# Patient Record
Sex: FEMALE | Race: WHITE | NOT HISPANIC OR LATINO | Employment: OTHER | URBAN - METROPOLITAN AREA
[De-identification: names, ages, dates, MRNs, and addresses within clinical notes are randomized per-mention and may not be internally consistent; named-entity substitution may affect disease eponyms.]

---

## 2017-02-01 DIAGNOSIS — Z01.810 PRE-OPERATIVE CARDIOVASCULAR EXAMINATION: ICD-10-CM

## 2017-02-01 DIAGNOSIS — Z01.812 PRE-OPERATIVE LABORATORY EXAMINATION: ICD-10-CM

## 2017-02-01 LAB
ANION GAP SERPL CALC-SCNC: 9 MMOL/L (ref 0–11.9)
BUN SERPL-MCNC: 9 MG/DL (ref 8–22)
CALCIUM SERPL-MCNC: 9.3 MG/DL (ref 8.4–10.2)
CHLORIDE SERPL-SCNC: 102 MMOL/L (ref 96–112)
CO2 SERPL-SCNC: 27 MMOL/L (ref 20–33)
CREAT SERPL-MCNC: 0.75 MG/DL (ref 0.5–1.4)
EKG IMPRESSION: NORMAL
GFR SERPL CREATININE-BSD FRML MDRD: >60 ML/MIN/1.73 M 2
GLUCOSE SERPL-MCNC: 108 MG/DL (ref 65–99)
POTASSIUM SERPL-SCNC: 3.6 MMOL/L (ref 3.6–5.5)
SODIUM SERPL-SCNC: 138 MMOL/L (ref 135–145)

## 2017-02-01 PROCEDURE — 36415 COLL VENOUS BLD VENIPUNCTURE: CPT

## 2017-02-01 PROCEDURE — 80048 BASIC METABOLIC PNL TOTAL CA: CPT

## 2017-02-01 RX ORDER — AMLODIPINE BESYLATE 5 MG/1
5 TABLET ORAL EVERY MORNING
COMMUNITY

## 2017-02-01 RX ORDER — CALCIUM CARBONATE 500 MG/1
500 TABLET, CHEWABLE ORAL PRN
COMMUNITY
End: 2017-11-30

## 2017-02-01 RX ORDER — M-VIT,TX,IRON,MINS/CALC/FOLIC 27MG-0.4MG
1 TABLET ORAL DAILY
COMMUNITY
End: 2017-11-30

## 2017-02-01 NOTE — OR NURSING
PreAdmit Appointment: Patient instructed to continue regularly prescribed medications through day before surgery. Instructed to take the following medications the day of surgery with a sip of water per Anesthesia protocol: Amlodipine, Benicar.

## 2017-02-02 ENCOUNTER — APPOINTMENT (OUTPATIENT)
Dept: RADIOLOGY | Facility: MEDICAL CENTER | Age: 62
End: 2017-02-02
Attending: PODIATRIST
Payer: COMMERCIAL

## 2017-02-02 ENCOUNTER — HOSPITAL ENCOUNTER (OUTPATIENT)
Facility: MEDICAL CENTER | Age: 62
End: 2017-02-02
Attending: PODIATRIST | Admitting: PODIATRIST
Payer: COMMERCIAL

## 2017-02-02 VITALS
RESPIRATION RATE: 16 BRPM | HEIGHT: 61 IN | OXYGEN SATURATION: 95 % | DIASTOLIC BLOOD PRESSURE: 71 MMHG | BODY MASS INDEX: 36.3 KG/M2 | SYSTOLIC BLOOD PRESSURE: 130 MMHG | WEIGHT: 192.24 LBS | TEMPERATURE: 98.8 F

## 2017-02-02 PROBLEM — M20.11 ACQUIRED HALLUX VALGUS OF RIGHT FOOT: Status: ACTIVE | Noted: 2017-02-02

## 2017-02-02 PROCEDURE — 501838 HCHG SUTURE GENERAL: Performed by: PODIATRIST

## 2017-02-02 PROCEDURE — 700102 HCHG RX REV CODE 250 W/ 637 OVERRIDE(OP): Performed by: PODIATRIST

## 2017-02-02 PROCEDURE — 160041 HCHG SURGERY MINUTES - EA ADDL 1 MIN LEVEL 4: Performed by: PODIATRIST

## 2017-02-02 PROCEDURE — 700102 HCHG RX REV CODE 250 W/ 637 OVERRIDE(OP)

## 2017-02-02 PROCEDURE — A9270 NON-COVERED ITEM OR SERVICE: HCPCS | Performed by: PODIATRIST

## 2017-02-02 PROCEDURE — 502240 HCHG MISC OR SUPPLY RC 0272: Performed by: PODIATRIST

## 2017-02-02 PROCEDURE — 73620 X-RAY EXAM OF FOOT: CPT | Mod: RT

## 2017-02-02 PROCEDURE — 160025 RECOVERY II MINUTES (STATS): Performed by: PODIATRIST

## 2017-02-02 PROCEDURE — 160035 HCHG PACU - 1ST 60 MINS PHASE I: Performed by: PODIATRIST

## 2017-02-02 PROCEDURE — 160046 HCHG PACU - 1ST 60 MINS PHASE II: Performed by: PODIATRIST

## 2017-02-02 PROCEDURE — 500433 HCHG DRESSING, KLING 4': Performed by: PODIATRIST

## 2017-02-02 PROCEDURE — 500126 HCHG BOVIE, NEEDLE TIP: Performed by: PODIATRIST

## 2017-02-02 PROCEDURE — 110382 HCHG SHELL REV 271: Performed by: PODIATRIST

## 2017-02-02 PROCEDURE — 160002 HCHG RECOVERY MINUTES (STAT): Performed by: PODIATRIST

## 2017-02-02 PROCEDURE — 700111 HCHG RX REV CODE 636 W/ 250 OVERRIDE (IP): Performed by: PODIATRIST

## 2017-02-02 PROCEDURE — 160022 HCHG BLOCK: Performed by: PODIATRIST

## 2017-02-02 PROCEDURE — 160047 HCHG PACU  - EA ADDL 30 MINS PHASE II: Performed by: PODIATRIST

## 2017-02-02 PROCEDURE — 160029 HCHG SURGERY MINUTES - 1ST 30 MINS LEVEL 4: Performed by: PODIATRIST

## 2017-02-02 PROCEDURE — C1713 ANCHOR/SCREW BN/BN,TIS/BN: HCPCS | Performed by: PODIATRIST

## 2017-02-02 PROCEDURE — 700101 HCHG RX REV CODE 250

## 2017-02-02 PROCEDURE — 160036 HCHG PACU - EA ADDL 30 MINS PHASE I: Performed by: PODIATRIST

## 2017-02-02 PROCEDURE — 160009 HCHG ANES TIME/MIN: Performed by: PODIATRIST

## 2017-02-02 PROCEDURE — 700111 HCHG RX REV CODE 636 W/ 250 OVERRIDE (IP)

## 2017-02-02 PROCEDURE — A9270 NON-COVERED ITEM OR SERVICE: HCPCS

## 2017-02-02 PROCEDURE — 502000 HCHG MISC OR IMPLANTS RC 0278: Performed by: PODIATRIST

## 2017-02-02 PROCEDURE — 110371 HCHG SHELL REV 272: Performed by: PODIATRIST

## 2017-02-02 PROCEDURE — 500053 HCHG BANDAGE, ELASTIC 4: Performed by: PODIATRIST

## 2017-02-02 PROCEDURE — 160048 HCHG OR STATISTICAL LEVEL 1-5: Performed by: PODIATRIST

## 2017-02-02 PROCEDURE — A4606 OXYGEN PROBE USED W OXIMETER: HCPCS | Performed by: PODIATRIST

## 2017-02-02 PROCEDURE — 500881 HCHG PACK, EXTREMITY: Performed by: PODIATRIST

## 2017-02-02 DEVICE — DBM PUTTY PROGENIX 5.0CC: Type: IMPLANTABLE DEVICE | Status: FUNCTIONAL

## 2017-02-02 DEVICE — IMPLANTABLE DEVICE: Type: IMPLANTABLE DEVICE | Status: FUNCTIONAL

## 2017-02-02 RX ORDER — BUPIVACAINE HYDROCHLORIDE AND EPINEPHRINE 5; 5 MG/ML; UG/ML
INJECTION, SOLUTION EPIDURAL; INTRACAUDAL; PERINEURAL
Status: DISCONTINUED | OUTPATIENT
Start: 2017-02-02 | End: 2017-02-02 | Stop reason: HOSPADM

## 2017-02-02 RX ORDER — ACETAMINOPHEN 500 MG
1000 TABLET ORAL EVERY 6 HOURS
Status: DISCONTINUED | OUTPATIENT
Start: 2017-02-02 | End: 2017-02-02 | Stop reason: HOSPADM

## 2017-02-02 RX ORDER — OXYCODONE HCL 5 MG/5 ML
SOLUTION, ORAL ORAL
Status: COMPLETED
Start: 2017-02-02 | End: 2017-02-02

## 2017-02-02 RX ORDER — LIDOCAINE HYDROCHLORIDE 10 MG/ML
INJECTION, SOLUTION INFILTRATION; PERINEURAL
Status: DISCONTINUED | OUTPATIENT
Start: 2017-02-02 | End: 2017-02-02 | Stop reason: HOSPADM

## 2017-02-02 RX ORDER — LIDOCAINE HYDROCHLORIDE 10 MG/ML
INJECTION, SOLUTION INFILTRATION; PERINEURAL
Status: COMPLETED
Start: 2017-02-02 | End: 2017-02-02

## 2017-02-02 RX ORDER — SODIUM CHLORIDE, SODIUM LACTATE, POTASSIUM CHLORIDE, CALCIUM CHLORIDE 600; 310; 30; 20 MG/100ML; MG/100ML; MG/100ML; MG/100ML
1000 INJECTION, SOLUTION INTRAVENOUS
Status: DISCONTINUED | OUTPATIENT
Start: 2017-02-02 | End: 2017-02-02 | Stop reason: HOSPADM

## 2017-02-02 RX ORDER — ONDANSETRON 2 MG/ML
4 INJECTION INTRAMUSCULAR; INTRAVENOUS EVERY 6 HOURS PRN
Status: DISCONTINUED | OUTPATIENT
Start: 2017-02-02 | End: 2017-02-02 | Stop reason: HOSPADM

## 2017-02-02 RX ADMIN — SODIUM CHLORIDE, POTASSIUM CHLORIDE, SODIUM LACTATE AND CALCIUM CHLORIDE 1000 ML: 600; 310; 30; 20 INJECTION, SOLUTION INTRAVENOUS at 09:51

## 2017-02-02 RX ADMIN — LIDOCAINE HYDROCHLORIDE 0.2 ML: 10 INJECTION, SOLUTION INFILTRATION; PERINEURAL at 09:51

## 2017-02-02 RX ADMIN — OXYCODONE HYDROCHLORIDE 5 MG: 5 SOLUTION ORAL at 14:35

## 2017-02-02 ASSESSMENT — PAIN SCALES - GENERAL
PAINLEVEL_OUTOF10: 0
PAINLEVEL_OUTOF10: 4
PAINLEVEL_OUTOF10: 0
PAINLEVEL_OUTOF10: 0
PAINLEVEL_OUTOF10: 4
PAINLEVEL_OUTOF10: 0
PAINLEVEL_OUTOF10: 4

## 2017-02-02 NOTE — OR NURSING
0971Pt to pre-op. Name, birthday, allergies, NPO status, last void, medication rec, surgical site and procedure verified with patient.  Pt belongings collected and secured in locker.  Pt verbalizes understanding of pain scale, expected plan of care and course of stay.  IV established. Sequentials placed.  Family at bedside.

## 2017-02-02 NOTE — OR NURSING
1345 assumed care of pt. Even non labored breathing. Skin pink warm dry. Sleeping. RLE dressing cdi, foot boot, brisk cap refill, pink warm, elevated on pillow, ice pack to lower part of foot.   1400 awake, denies pain and nausea. Even non labored breathing, face relaxed. Wiggles toes, dorsi flexion/extension performed.   1415 no changes  1430 report given to Seema Alberts

## 2017-02-02 NOTE — IP AVS SNAPSHOT
2/2/2017          Yuko Boo  [address] [place] [address]    Dear Yuko:    Critical access hospital wants to ensure your discharge home is safe and you or your loved ones have had all your questions answered regarding your care after you leave the hospital.    You may receive a telephone call within two days of your discharge.  This call is to make certain you understand your discharge instructions as well as ensure we provided you with the best care possible during your stay with us.     The call will only last approximately 3-5 minutes and will be done by a nurse.    Once again, we want to ensure your discharge home is safe and that you have a clear understanding of any next steps in your care.  If you have any questions or concerns, please do not hesitate to contact us, we are here for you.  Thank you for choosing [facility] for your healthcare needs.    Sincerely,    Vaibhav Landrum    [facility]

## 2017-02-02 NOTE — IP AVS SNAPSHOT
" After Visit Summary                                                                                                                Name:Yuko Boo  Medical Record Number:[id number]  CSN: [id number]    YOB: 1955   Age: 61 y.o.  Sex: female  HT:1.549 m (5' 1\") WT: 87.2 kg (192 lb 3.9 oz)          Admit Date: 2/2/2017     Discharge Date:   Today's Date: 2/2/2017  Attending Doctor:  Aldo Antonio D.P.M.                  Allergies:  Review of patient's allergies indicates no known allergies.                Discharge Instructions         ACTIVITY: Rest and take it easy for the first 24 hours.  A responsible adult is recommended to remain with you during that time.  It is normal to feel sleepy.  We encourage you to not do anything that requires balance, judgment or coordination.    MILD FLU-LIKE SYMPTOMS ARE NORMAL. YOU MAY EXPERIENCE GENERALIZED MUSCLE ACHES, THROAT IRRITATION, HEADACHE AND/OR SOME NAUSEA.    FOR 24 HOURS DO NOT:  Drive, operate machinery or run household appliances.  Drink beer or alcoholic beverages.   Make important decisions or sign legal documents.    SPECIAL INSTRUCTIONS:     Right lower extremity: Non weight bearing  Keep dressing clean dry intact until further MD instructions  Wear post op shoe at all times  Keep dressing clean, dry and intact until physician indicates otherwise  If you develop any chest pain, shortness of breath, palpitations or any issues with your heart go to the ER (per anesthesia doctor)    DIET: To avoid nausea, slowly advance diet as tolerated, avoiding spicy or greasy foods for the first day.  Add more substantial food to your diet according to your physician's instructions.  Babies can be fed formula or breast milk as soon as they are hungry.  INCREASE FLUIDS AND FIBER TO AVOID CONSTIPATION.    FOLLOW-UP APPOINTMENT:  A follow-up appointment should be arranged with your doctor: call to schedule.    You should CALL YOUR PHYSICIAN if you " develop:  Fever greater than 101 degrees F.  Pain not relieved by medication, or persistent nausea or vomiting.  Excessive bleeding (blood soaking through dressing) or unexpected drainage from the wound.  Extreme redness or swelling around the incision site, drainage of pus or foul smelling drainage.  Inability to urinate or empty your bladder within 8 hours.  Problems with breathing or chest pain.    You should call 911 if you develop problems with breathing or chest pain.  If you are unable to contact your doctor or surgical center, you should go to the nearest emergency room or urgent care center.    Physician's telephone #: [phone]    If any questions arise, call your doctor.  If your doctor is not available, please feel free to call the Surgical Center at [phone].  The Center is open Monday through Friday from 7AM to 7PM.  You can also call the [employer] HOTLINE open 24 hours/day, 7 days/week and speak to a nurse at [phone], or toll free at [phone].    A registered nurse may call you a few days after your surgery to see how you are doing after your procedure.    MEDICATIONS: Resume taking daily medication.  Take prescribed pain medication with food.  If no medication is prescribed, you may take non-aspirin pain medication if needed.  PAIN MEDICATION CAN BE VERY CONSTIPATING.  Take a stool softener or laxative such as senokot, pericolace, or milk of magnesia if needed.    Prescription given for preopertively.  Last pain medication given at 2:35PM (5mg oxycodone).    If your physician has prescribed pain medication that includes Acetaminophen (Tylenol), do not take additional Acetaminophen (Tylenol) while taking the prescribed medication.    Depression / Suicide Risk    As you are discharged from this [facility] facility, it is important to learn how to keep safe from harming yourself.    Recognize the warning signs:  · Abrupt changes in personality, positive or negative- including  increase in energy   · Giving away possessions  · Change in eating patterns- significant weight changes-  positive or negative  · Change in sleeping patterns- unable to sleep or sleeping all the time   · Unwillingness or inability to communicate  · Depression  · Unusual sadness, discouragement and loneliness  · Talk of wanting to die  · Neglect of personal appearance   · Rebelliousness- reckless behavior  · Withdrawal from people/activities they love  · Confusion- inability to concentrate     If you or a loved one observes any of these behaviors or has concerns about self-harm, here's what you can do:  · Talk about it- your feelings and reasons for harming yourself  · Remove any means that you might use to hurt yourself (examples: pills, rope, extension cords, firearm)  · Get professional help from the community (Mental Health, Substance Abuse, psychological counseling)  · Do not be alone:Call your Safe Contact- someone whom you trust who will be there for you.  · Call your local CRISIS HOTLINE 881-3455 or 903-904-3003  · Call your local Children's Mobile Crisis Response Team [place] (944) 153-6377 or www[employer]  · Call the toll free National Suicide Prevention Hotlines   · National Suicide Prevention Lifeline 336-922-PZQJ (1596)  National Hope Line Network 800-SUICIDE (755-7011)    Discharge Education for patients on DELILAH (Obstructive Sleep Apnea) Protocol    Prior to receiving sedation or anesthesia, we screen all patients for Obstructive Sleep Apnea.  During your screening, you were identified as having suspected, but not confirmed Obstructive Sleep Apnea(DELILAH).    What is Obstructive Sleep Apnea?  Sleep apnea (AP-ne-ah) is a common disorder which involves breathing pauses that occur during sleep.  These can last from 10 seconds to a minute or longer.  Normal breathing resumes often with a loud snort or choking sound.    Sleep apnea occurs in all age groups and both genders but is more common in men  and people over 40 years of age.  It has been estimated that as many as 18 million Americans have sleep apnea.  Most people who have sleep apnea don’t know they have it because it only occurs during sleep.  A family member and/or bed partner may first notice the signs of sleep apnea.  Sleep apnea is a chronic (ongoing) condition that disrupts the quality and quantity of your sleep repeatedly throughout the night.  This often results in excessive daytime sleepiness or fatigue during the day.  It may also contribute to high blood pressure, heart problems, and complications following medications used for surgery and procedures.    To establish a definitive diagnosis, further testing from a specialist would be needed.  We recommend that you follow up with your primary care physician.    We recommend that you should be with an adult observer for at least 24 hours after your sedation/anesthesia.  If you have a CPAP machine, you should wear it during any sleep period (day or night) for the week following your procedure.  We encourage you to sleep on your side or in a sitting position, even with napping.  Lying flat on your back increases the risk of apnea and airway obstruction during your post procedure recovery period.    It is important to prevent over-sedation that could increase your risk for apnea.  Please take all pain medication as directed by your physician.  If you are not getting pain relief, please contact your physician to discuss possible approaches to relieving pain while minimizing medications that can affect your breathing and oxygen levels.         Medication List      CONTINUE taking these medications        Instructions    ALEVE PO    Take  by mouth as needed.       amlodipine 5 MG Tabs   Commonly known as:  NORVASC    Take 5 mg by mouth every morning.   Dose:  5 mg       calcium carbonate 500 MG Chew   Commonly known as:  TUMS    Take 500 mg by mouth as needed.   Dose:  500 mg       olmesartan 40 MG  Tabs   Commonly known as:  BENICAR    Take 1 Tab by mouth every day.   Dose:  40 mg       therapeutic multivitamin-minerals Tabs    Take 1 Tab by mouth every day.   Dose:  1 Tab       triamterene-hctz 37.5-25 MG Tabs   Commonly known as:  MAXZIDE-25/DYAZIDE    Take 1 Tab by mouth every day.   Dose:  1 Tab       vitamin D 1000 UNIT Tabs   Commonly known as:  cholecalciferol    Take 1,000 Units by mouth every day.   Dose:  1000 Units               Medication Information     Above and/or attached are the medications your physician expects you to take upon discharge. Review all of your home medications and newly ordered medications with your doctor and/or pharmacist. Follow medication instructions as directed by your doctor and/or pharmacist. Please keep your medication list with you and share with your physician. Update the information when medications are discontinued, doses are changed, or new medications (including over-the-counter products) are added; and carry medication information at all times in the event of emergency situations.        Resources     Quit Smoking / Tobacco Use:    I understand the use of any tobacco products increases my chance of suffering from future heart disease or stroke and could cause other illnesses which may shorten my life. Quitting the use of tobacco products is the single most important thing I can do to improve my health. For further information on smoking / tobacco cessation call a Toll Free Quit Line at 1-798.920.1435 (*[employer] [place]) or 1-765.862.4876 (American Lung Association) or you can access the web based program at www.lungusa.org.    [place] Tobacco Users Help Line:  (585) 912-9472       Toll Free: 1-578.260.8537  Quit Tobacco Program [facility] (335)104-5454    Crisis Hotline:    [place] Crisis Hotline:  6-587-YGNEMSV or 1-836.222.1078    [place] Crisis Hotline:    1-863.805.1426 or 490-741-1864    Discharge Survey:   Thank you for  choosing [facility]. We hope we did everything we could to make your hospital stay a pleasant one. You may be receiving a survey and we would appreciate your time and participation in answering the questions. Your input is very valuable to us in our efforts to improve our service to our patients and their families.            Signatures     My signature on this form indicates that:    1. I acknowledge receipt and understanding of these Home Care Instruction.  2. My questions regarding this information have been answered to my satisfaction.  3. I have formulated a plan with my discharge nurse to obtain my prescribed medications for home.    __________________________________      __________________________________                   Patient Signature                                 Guardian/Responsible Adult Signature      __________________________________                 __________       ________                       Nurse Signature                                               Date                 Time

## 2017-02-02 NOTE — PROGRESS NOTES
Anesthesia Post-op Note    Patient is a 61 year old female with obesity, HTN, and borderline DM type II. She underwent bunionectomy. The case was uneventful until the end when the surgeon was placing the dressing and I noticed the patient developed what looked like a new bundle branch block (unable to tell if RBBB or LBBB given that only lead II was being monitored intra-operatively at the time. All patient's vital signs were stable. The tourniquet was still up (approximately 100 mins total time at that point) and I was turning down the vapor anesthetic concentration in preparation for emergence from anesthesia. The tourniquet came down one minute later and approximately 5 mins later when patient was awake in PACU the EKG returned to her normal baseline tracing and unfortunately we were unable to capture what appeared to be a bundle branch block on a 12-lead EKG. During her stay in PACU her EKG remained normal and patient denied any concerning cardiac symptoms. It appears that the EKG abnormality maybe just be due to some excessive sympathetic stimulation given the tourniquet pain in setting of emergence from anesthesia. She was informed of the incident and given strict return precautions. Otherwise, her meliton-operative course was uneventful.    David Beckham  [facility]

## 2017-02-02 NOTE — OR NURSING
1430: resumed care of patient. Dressing CDI with ice pack in place. Denies nausea, pain 4/10, plan to medicate with orals.  1445: meets criteria for stage 2. Will call report.  1448: report to KEE Altamirano.

## 2017-02-02 NOTE — DISCHARGE INSTRUCTIONS
ACTIVITY: Rest and take it easy for the first 24 hours.  A responsible adult is recommended to remain with you during that time.  It is normal to feel sleepy.  We encourage you to not do anything that requires balance, judgment or coordination.    MILD FLU-LIKE SYMPTOMS ARE NORMAL. YOU MAY EXPERIENCE GENERALIZED MUSCLE ACHES, THROAT IRRITATION, HEADACHE AND/OR SOME NAUSEA.    FOR 24 HOURS DO NOT:  Drive, operate machinery or run household appliances.  Drink beer or alcoholic beverages.   Make important decisions or sign legal documents.    SPECIAL INSTRUCTIONS:     Right lower extremity: Non weight bearing  Keep dressing clean dry intact until further MD instructions  Wear post op shoe at all times  Keep dressing clean, dry and intact until physician indicates otherwise  If you develop any chest pain, shortness of breath, palpitations or any issues with your heart go to the ER (per anesthesia doctor)    DIET: To avoid nausea, slowly advance diet as tolerated, avoiding spicy or greasy foods for the first day.  Add more substantial food to your diet according to your physician's instructions.  Babies can be fed formula or breast milk as soon as they are hungry.  INCREASE FLUIDS AND FIBER TO AVOID CONSTIPATION.    FOLLOW-UP APPOINTMENT:  A follow-up appointment should be arranged with your doctor: call to schedule.    You should CALL YOUR PHYSICIAN if you develop:  Fever greater than 101 degrees F.  Pain not relieved by medication, or persistent nausea or vomiting.  Excessive bleeding (blood soaking through dressing) or unexpected drainage from the wound.  Extreme redness or swelling around the incision site, drainage of pus or foul smelling drainage.  Inability to urinate or empty your bladder within 8 hours.  Problems with breathing or chest pain.    You should call 911 if you develop problems with breathing or chest pain.  If you are unable to contact your doctor or surgical center, you should go to the nearest  emergency room or urgent care center.    Physician's telephone #: [phone]    If any questions arise, call your doctor.  If your doctor is not available, please feel free to call the Surgical Center at [phone].  The Center is open Monday through Friday from 7AM to 7PM.  You can also call the HEALTH HOTLINE open 24 hours/day, 7 days/week and speak to a nurse at [phone], or toll free at [phone].    A registered nurse may call you a few days after your surgery to see how you are doing after your procedure.    MEDICATIONS: Resume taking daily medication.  Take prescribed pain medication with food.  If no medication is prescribed, you may take non-aspirin pain medication if needed.  PAIN MEDICATION CAN BE VERY CONSTIPATING.  Take a stool softener or laxative such as senokot, pericolace, or milk of magnesia if needed.    Prescription given for preopertively.  Last pain medication given at 2:35PM (5mg oxycodone).    If your physician has prescribed pain medication that includes Acetaminophen (Tylenol), do not take additional Acetaminophen (Tylenol) while taking the prescribed medication.    Depression / Suicide Risk    As you are discharged from this [facility] facility, it is important to learn how to keep safe from harming yourself.    Recognize the warning signs:  · Abrupt changes in personality, positive or negative- including increase in energy   · Giving away possessions  · Change in eating patterns- significant weight changes-  positive or negative  · Change in sleeping patterns- unable to sleep or sleeping all the time   · Unwillingness or inability to communicate  · Depression  · Unusual sadness, discouragement and loneliness  · Talk of wanting to die  · Neglect of personal appearance   · Rebelliousness- reckless behavior  · Withdrawal from people/activities they love  · Confusion- inability to concentrate     If you or a loved one observes any of these behaviors or has concerns about  self-harm, here's what you can do:  · Talk about it- your feelings and reasons for harming yourself  · Remove any means that you might use to hurt yourself (examples: pills, rope, extension cords, firearm)  · Get professional help from the community (Mental Health, Substance Abuse, psychological counseling)  · Do not be alone:Call your Safe Contact- someone whom you trust who will be there for you.  · Call your local CRISIS HOTLINE 988-5188 or 756-263-3635  · Call your local Children's Mobile Crisis Response Team [place] (480) 504-7612 or www.[employer]  · Call the toll free National Suicide Prevention Hotlines   · National Suicide Prevention Lifeline 722-792-NPGI (4677)  [place] [employer] Line Network 800-SUICIDE (505-7926)    Discharge Education for patients on DELILAH (Obstructive Sleep Apnea) Protocol    Prior to receiving sedation or anesthesia, we screen all patients for Obstructive Sleep Apnea.  During your screening, you were identified as having suspected, but not confirmed Obstructive Sleep Apnea(DELILAH).    What is Obstructive Sleep Apnea?  Sleep apnea (AP-ne-ah) is a common disorder which involves breathing pauses that occur during sleep.  These can last from 10 seconds to a minute or longer.  Normal breathing resumes often with a loud snort or choking sound.    Sleep apnea occurs in all age groups and both genders but is more common in men and people over 40 years of age.  It has been estimated that as many as 18 million Americans have sleep apnea.  Most people who have sleep apnea don’t know they have it because it only occurs during sleep.  A family member and/or bed partner may first notice the signs of sleep apnea.  Sleep apnea is a chronic (ongoing) condition that disrupts the quality and quantity of your sleep repeatedly throughout the night.  This often results in excessive daytime sleepiness or fatigue during the day.  It may also contribute to high blood pressure, heart problems, and  complications following medications used for surgery and procedures.    To establish a definitive diagnosis, further testing from a specialist would be needed.  We recommend that you follow up with your primary care physician.    We recommend that you should be with an adult observer for at least 24 hours after your sedation/anesthesia.  If you have a CPAP machine, you should wear it during any sleep period (day or night) for the week following your procedure.  We encourage you to sleep on your side or in a sitting position, even with napping.  Lying flat on your back increases the risk of apnea and airway obstruction during your post procedure recovery period.    It is important to prevent over-sedation that could increase your risk for apnea.  Please take all pain medication as directed by your physician.  If you are not getting pain relief, please contact your physician to discuss possible approaches to relieving pain while minimizing medications that can affect your breathing and oxygen levels.

## 2017-02-02 NOTE — OR NURSING
1453 patient to stage 2  Patient settled in recliner chair post short ambulation from [facility] - pt dressed with assist by CNA. Dressing CDI to R foot/toes, elevated on pillow with ice pack on R ankle. Pain rated as tolerable and denies nausea.   1520 Crutch training for NWB demonstrated, education sheet given and reviewed and verbal instructions given during demonstration. Pt able to demonstrate safe ambulation 20 feet into bathroom with safe transfer techniques as shown by RN.   1532 D/Ajay to care of family post uneventful stay in PACU 2.

## 2017-02-02 NOTE — OR NURSING
1338: To PACU from OR via gurney, sleeping, respirations spontaneous and non-labored via OPA, NC @ 4L blow-by. Dressing to right foot CDI, post op shoe in place, cap refill to toes  < 3 secs. Anesthesia indicated patient's EKG had changed to what appears to be a RBBB but patient's rhythm changed back to original rhythm after about a minute of being in PACU, he indicated to keep monitoring and if it changes again to get 12 lead EKG.  1345: OPA d/joaquim, too drowsy to assess pain/nausea level. Report to KEE Sullivan.

## 2017-02-02 NOTE — OR SURGEON
Immediate Post-Operative Note      PreOp Diagnosis:  Severe hallux valgus deformity right foot     PostOp Diagnosis: same    Procedure(s):  ORTHOPEDIC OSTEOTOMY -  IST METATARSAL W/AUTOGRAFT/ALLOGRAFT - Wound Class: Clean  BUNIONECTOMY - GARRIDO     Surgeon(s):  Aldo Antonio D.P.M.    Anesthesiologist/Type of Anesthesia:  Anesthesiologist: José Luis Beckham M.D./General    Surgical Staff:  Circulator: PALAK Liao Circulator: Lavonne Kumar R.N.  Scrub Person: Ed Hernandez    Specimen: NONE    Estimated Blood Loss: NONE    Findings:Severe hallux valgus deformity right foot         Complications: NONE        2/2/2017 1:57 PM Aldo Antonio

## 2017-02-03 NOTE — OP REPORT
DATE OF SERVICE:  02/02/2017    PREOPERATIVE DIAGNOSIS:  Severe hallux valgus deformity, right foot.    POSTOPERATIVE DIAGNOSIS:  Severe hallux valgus deformity, right foot.    SURGEON:  Aldo Antonio DPM    ANESTHESIOLOGIST:  José Luis Beckham MD.    INDICATIONS FOR SURGERY:  Failed conservative and supportive measures, bracing   appropriate shoes, anti-inflammatories without relief of symptomology.    NARRATIVE:  Prior to routine sterile scrub and prep, the patient is   identified, surgical [facility] is [facility] and antibiotics   have been given 20 minutes prior to tourniquet inflation.  The surgical site   being the right foot identified and nature of procedure identified, after   which time, the patient was brought to the operatory, the foot was elevated   and exsanguinated and a mini prep is accomplished utilizing 0.5% Marcaine,   1:200,000 epinephrine, 1% Xylocaine plain in a 1:1 ratio, 14 mL for field   block at midfoot and 4 more mL at the surgical site base of the first   metatarsal and first metatarsophalangeal joint at the end of procedure.  After   which time, routine sterile scrub and prep having been completed.  The foot   elevated using an Esmarch for exsanguination.  The tourniquet was raised to   225 mmHg.  The foot was lowered to surgical field.  Our attention was directed   to the dorsal aspect of the right first metatarsophalangeal joint and the   metatarsal cuneiform joint where dorsal longitudinal incision across both   joints is accomplished.  Sharp and dull dissection carefully reflecting   subcutaneous tissue at the first metatarsophalangeal joint.  The subcutaneous   tissue is reflected medially and then deep to the first interspace where blunt   and dull dissection identified the adductor hallucis tendon.  It is   transected coaxially off the base of proximal phalanx delivered into the wound   and tagged with 2-0 Supramid.  Lateral capsular release has been    accomplished.  The longitudinal capsulorrhaphy with a T-wing proximal   metatarsophalangeal joint having been completed, delivered the prominent   medial eminence into the wound.  With a power saw, some nonviable bone on the   medial side is resected first and then an allograft, the medial eminence was   resected and capped for the opening wedge osteotomy at the base of the first   metatarsal.  The incision is taken to the metatarsal cuneiform joint with   fluoroscopy identifying the level of the joint and appropriate bone cut is   made approximately 1.5 cm distal from the metatarsal cuneiform joint angle to   the base of proximal phalanx.  The cut was completed, notably at any and all   times using power equipment, copious lavage has been utilized.  The Integra   plating system is utilized.  The fluoroscopy identified appropriate bone cut.    The first metatarsal is opened wedge osteotomy.  It is determined, a 5 mm   opening wedge is sufficient and 5 mm 4-hole opening wedge plate is put in the   open osteotomy and fixated with 2 cortex screws on the proximal side and 2   locking screws on the distal side.  The 2 cortex screws were 26 and 22 mm and   the 2 locking screws were 20 and 22 mm, all 2.7 locking.  All screws   nonlocking were additionally 2.7.  The bone wedge from the first metatarsal is   then placed and then 2.5 mL of bone paste is used to fill the void between   the allograft.  After which time, deep closure is accomplished at the base of   the first metatarsal with 3-0 Vicryl.  Attention is directed to the first   metatarsal where the metatarsophalangeal joint with capsulorrhaphy is reefed   to accommodate for the new angulation.  It is deemed to be satisfactory, sewn   with 3-0 Vicryl, remaining of capsular closure on the medial side with 3-0   Vicryl.  The 2-0 Supramid is brought under the extensor hallucis longus tendon   and sewn into medial capsule.  Remainder of capsule closure there also with    3-0 Vicryl.  Superficial structures with 4-0 Vicryl, skin with a running lock   stitch of 4-0 nylon, Steri-Strips, tincture of benzoin for additional closure,   Xeroform, 4x4s, and Manuelito.  Tourniquet is lowered.  An excellent vascular   refill noted at 1 hour and 40 minutes.  Patient tolerated the procedure   satisfactorily and leaves the operating room in good condition.  Note, the   patient at the end of procedure might have been a bundle-branch block that   this quickly subsided within the next 5 minutes.  She is in recovery under   observation to see if any of this occurs in this time sequence.       ____________________________________     TK POOLE / YEIMY    DD:  02/02/2017 14:07:09  DT:  02/02/2017 20:13:38    D#:  [id number]  Job#:  [id number]

## 2017-11-30 ENCOUNTER — OFFICE VISIT (OUTPATIENT)
Dept: URGENT CARE | Facility: PHYSICIAN GROUP | Age: 62
End: 2017-11-30
Payer: COMMERCIAL

## 2017-11-30 VITALS
TEMPERATURE: 99.2 F | WEIGHT: 193 LBS | BODY MASS INDEX: 35.51 KG/M2 | OXYGEN SATURATION: 94 % | RESPIRATION RATE: 16 BRPM | HEART RATE: 89 BPM | DIASTOLIC BLOOD PRESSURE: 74 MMHG | HEIGHT: 62 IN | SYSTOLIC BLOOD PRESSURE: 130 MMHG

## 2017-11-30 DIAGNOSIS — J01.10 ACUTE FRONTAL SINUSITIS, RECURRENCE NOT SPECIFIED: ICD-10-CM

## 2017-11-30 PROCEDURE — 99214 OFFICE O/P EST MOD 30 MIN: CPT | Performed by: PHYSICIAN ASSISTANT

## 2017-11-30 RX ORDER — AMOXICILLIN AND CLAVULANATE POTASSIUM 875; 125 MG/1; MG/1
1 TABLET, FILM COATED ORAL 2 TIMES DAILY
Qty: 14 TAB | Refills: 0 | Status: SHIPPED | OUTPATIENT
Start: 2017-11-30 | End: 2017-12-07

## 2017-11-30 RX ORDER — FLUTICASONE PROPIONATE 50 MCG
2 SPRAY, SUSPENSION (ML) NASAL DAILY
Qty: 1 BOTTLE | Refills: 1 | Status: SHIPPED | OUTPATIENT
Start: 2017-11-30 | End: 2022-03-04

## 2017-11-30 ASSESSMENT — ENCOUNTER SYMPTOMS
FEVER: 0
SINUS PRESSURE: 1
HEADACHES: 1
COUGH: 1
SHORTNESS OF BREATH: 0
CHILLS: 0
WHEEZING: 0

## 2017-12-01 NOTE — PROGRESS NOTES
Subjective:      Yuko Boo is a 62 y.o. female who presents with Sinus Problem (x1wk nasal pressure/pain, congestion, bilateral ear pain)            Rhinorrhea, congestion, sinus pressure/pain, bilateral ear pain for the last week. Symptoms fluctuating. Left ear was very painful yesterday but seems to be somewhat improved today. Also reports mild cough over the last couple of days. No shortness of breath or wheezing. No fevers or chills      Sinus Problem   This is a new problem. The current episode started in the past 7 days. The problem has been waxing and waning since onset. There has been no fever. The pain is moderate. Associated symptoms include congestion, coughing, ear pain, headaches and sinus pressure. Pertinent negatives include no chills or shortness of breath. Past treatments include saline sprays. The treatment provided mild relief.       Review of Systems   Constitutional: Negative for chills and fever.   HENT: Positive for congestion, ear pain and sinus pressure. Negative for ear discharge.    Respiratory: Positive for cough. Negative for shortness of breath and wheezing.    Neurological: Positive for headaches.     Allergies:Patient has no known allergies.    Current Outpatient Prescriptions Ordered in [facility]   Medication Sig Dispense Refill   • fluticasone (FLONASE) 50 MCG/ACT nasal spray Spray 2 Sprays in nose every day. 1 Bottle 1   • amoxicillin-clavulanate (AUGMENTIN) 875-125 MG Tab Take 1 Tab by mouth 2 times a day for 7 days. 14 Tab 0   • amlodipine (NORVASC) 5 MG Tab Take 5 mg by mouth every morning.     • triamterene-hctz (MAXZIDE-25/DYAZIDE) 37.5-25 MG TABS Take 1 Tab by mouth every day. 90 Each 3     No current Epic-ordered facility-administered medications on file.        Past Medical History:   Diagnosis Date   • Diabetes ([facility])    • Heart burn    • Hypertension    • Postmenopausal disorder    • Snoring        Social History   Substance Use Topics   • Smoking status: Never  "Smoker   • Smokeless tobacco: Never Used   • Alcohol use No       Family Status   Relation Status   • Mother    • Father      Family History   Problem Relation Age of Onset   • Heart Disease Mother    • Heart Attack Father           Objective:     /74   Pulse 89   Temp 37.3 °C (99.2 °F)   Resp 16   Ht 1.575 m (5' 2.01\")   Wt 87.5 kg (193 lb)   SpO2 94%   Breastfeeding? No   BMI 35.29 kg/m²      Physical Exam   Constitutional: She is oriented to person, place, and time. She appears well-developed and well-nourished. No distress.   HENT:   Head: Normocephalic and atraumatic.   Right Ear: External ear normal.   Left Ear: External ear normal.   Mouth/Throat: Oropharynx is clear and moist.   Canals unremarkable. Right tympanic membrane slightly dull without erythema. Left tympanic membrane dull and mildly erythematous. Mild nasal mucosal edema. Frontal sinus tenderness   Neck: Normal range of motion. Neck supple.   Cardiovascular: Normal rate and regular rhythm.    Pulmonary/Chest: Effort normal and breath sounds normal. She has no wheezes. She has no rales.   Neurological: She is alert and oriented to person, place, and time.   Skin: Skin is warm and dry. She is not diaphoretic.   Psychiatric: She has a normal mood and affect. Her behavior is normal. Judgment and thought content normal.   Nursing note and vitals reviewed.              Assessment/Plan:     1. Acute frontal sinusitis, recurrence not specified  fluticasone (FLONASE) 50 MCG/ACT nasal spray    amoxicillin-clavulanate (AUGMENTIN) 875-125 MG Tab    Ongoing for one week. Nasal mucosal edema with frontal sinus tenderness. Start Flonase irrigation. Augmentin if not improving in the next 3-5 days       [employer] Interactive Patient Education given:Sinusitis    Use Tylenol and/or ibuprofen as needed for pain or fever.  Use a Thanh Med, Neti Pot, or other nasal irrigation device daily.  Use Flonase daily.  May try a short course of a " decongestant such as Sudafed.  May try Afrin nasal spray for 3-5 days and then discontinue use.  May try an over-the-counter antihistamine such as Zyrtec, Claritin, or Allegra.  Use a cool mist humidifier with distilled water.  Elevate the head of your bed a few inches.  Drink plenty of fluids and get adequate rest.  Follow up with primary care provider in a few days if not improving.  Return for new or worsening symptoms.      Please note that this dictation was created using voice recognition software. I have made every reasonable attempt to correct obvious errors, but I expect that there are errors of grammar and possibly content that I did not discover before finalizing the note.

## 2017-12-01 NOTE — PATIENT INSTRUCTIONS
Use Tylenol and/or ibuprofen as needed for pain or fever.  Use a Thanh Med, Neti Pot, or other nasal irrigation device daily.  Use Flonase daily.  May try a short course of a decongestant such as Sudafed.  May try Afrin nasal spray for 3-5 days and then discontinue use.  May try an over-the-counter antihistamine such as Zyrtec, Claritin, or Allegra.  Use a cool mist humidifier with distilled water.  Elevate the head of your bed a few inches.  Drink plenty of fluids and get adequate rest.  Follow up with primary care provider in a few days if not improving.  Return for new or worsening symptoms.      Sinusitis, Adult  Sinusitis is redness, soreness, and inflammation of the paranasal sinuses. Paranasal sinuses are air pockets within the bones of your face. They are located beneath your eyes, in the middle of your forehead, and above your eyes. In healthy paranasal sinuses, mucus is able to drain out, and air is able to circulate through them by way of your nose. However, when your paranasal sinuses are inflamed, mucus and air can become trapped. This can allow bacteria and other germs to grow and cause infection.  Sinusitis can develop quickly and last only a short time (acute) or continue over a long period (chronic). Sinusitis that lasts for more than 12 weeks is considered chronic.  CAUSES  Causes of sinusitis include:  · Allergies.  · Structural abnormalities, such as displacement of the cartilage that separates your nostrils (deviated septum), which can decrease the air flow through your nose and sinuses and affect sinus drainage.  · Functional abnormalities, such as when the small hairs (cilia) that line your sinuses and help remove mucus do not work properly or are not present.  SIGNS AND SYMPTOMS  Symptoms of acute and chronic sinusitis are the same. The primary symptoms are pain and pressure around the affected sinuses. Other symptoms include:  · Upper toothache.  · Earache.  · Headache.  · Bad  breath.  · Decreased sense of smell and taste.  · A cough, which worsens when you are lying flat.  · Fatigue.  · Fever.  · Thick drainage from your nose, which often is green and may contain pus (purulent).  · Swelling and warmth over the affected sinuses.  DIAGNOSIS  Your health care provider will perform a physical exam. During your exam, your health care provider may perform any of the following to help determine if you have acute sinusitis or chronic sinusitis:  · Look in your nose for signs of abnormal growths in your nostrils (nasal polyps).  · Tap over the affected sinus to check for signs of infection.  · View the inside of your sinuses using an imaging device that has a light attached (endoscope).  If your health care provider suspects that you have chronic sinusitis, one or more of the following tests may be recommended:  · Allergy tests.  · Nasal culture. A sample of mucus is taken from your nose, sent to a lab, and screened for bacteria.  · Nasal cytology. A sample of mucus is taken from your nose and examined by your health care provider to determine if your sinusitis is related to an allergy.  TREATMENT  Most cases of acute sinusitis are related to a viral infection and will resolve on their own within 10 days. Sometimes, medicines are prescribed to help relieve symptoms of both acute and chronic sinusitis. These may include pain medicines, decongestants, nasal steroid sprays, or saline sprays.  However, for sinusitis related to a bacterial infection, your health care provider will prescribe antibiotic medicines. These are medicines that will help kill the bacteria causing the infection.  Rarely, sinusitis is caused by a fungal infection. In these cases, your health care provider will prescribe antifungal medicine.  For some cases of chronic sinusitis, surgery is needed. Generally, these are cases in which sinusitis recurs more than 3 times per year, despite other treatments.  HOME CARE  INSTRUCTIONS  · Drink plenty of water. Water helps thin the mucus so your sinuses can drain more easily.  · Use a humidifier.  · Inhale steam 3-4 times a day (for example, sit in the bathroom with the shower running).  · Apply a warm, moist washcloth to your face 3-4 times a day, or as directed by your health care provider.  · Use saline nasal sprays to help moisten and clean your sinuses.  · Take medicines only as directed by your health care provider.  · If you were prescribed either an antibiotic or antifungal medicine, finish it all even if you start to feel better.  SEEK IMMEDIATE MEDICAL CARE IF:  · You have increasing pain or severe headaches.  · You have nausea, vomiting, or drowsiness.  · You have swelling around your face.  · You have vision problems.  · You have a stiff neck.  · You have difficulty breathing.     This information is not intended to replace advice given to you by your health care provider. Make sure you discuss any questions you have with your health care provider.     Document Released: 12/18/2006 Document Revised: 01/08/2016 Document Reviewed: 01/01/2013  [employer] Interactive Patient Education ©2016 [employer]

## 2018-10-31 ENCOUNTER — APPOINTMENT (RX ONLY)
Dept: URBAN - NONMETROPOLITAN AREA CLINIC 15 | Facility: CLINIC | Age: 63
Setting detail: DERMATOLOGY
End: 2018-10-31

## 2018-10-31 DIAGNOSIS — L81.4 OTHER MELANIN HYPERPIGMENTATION: ICD-10-CM

## 2018-10-31 DIAGNOSIS — L30.4 ERYTHEMA INTERTRIGO: ICD-10-CM

## 2018-10-31 DIAGNOSIS — D22 MELANOCYTIC NEVI: ICD-10-CM

## 2018-10-31 DIAGNOSIS — D18.0 HEMANGIOMA: ICD-10-CM

## 2018-10-31 DIAGNOSIS — L82.1 OTHER SEBORRHEIC KERATOSIS: ICD-10-CM

## 2018-10-31 DIAGNOSIS — L73.8 OTHER SPECIFIED FOLLICULAR DISORDERS: ICD-10-CM

## 2018-10-31 DIAGNOSIS — L57.0 ACTINIC KERATOSIS: ICD-10-CM

## 2018-10-31 DIAGNOSIS — L91.8 OTHER HYPERTROPHIC DISORDERS OF THE SKIN: ICD-10-CM

## 2018-10-31 PROBLEM — D22.62 MELANOCYTIC NEVI OF LEFT UPPER LIMB, INCLUDING SHOULDER: Status: ACTIVE | Noted: 2018-10-31

## 2018-10-31 PROBLEM — D22.72 MELANOCYTIC NEVI OF LEFT LOWER LIMB, INCLUDING HIP: Status: ACTIVE | Noted: 2018-10-31

## 2018-10-31 PROBLEM — D18.01 HEMANGIOMA OF SKIN AND SUBCUTANEOUS TISSUE: Status: ACTIVE | Noted: 2018-10-31

## 2018-10-31 PROBLEM — D22.5 MELANOCYTIC NEVI OF TRUNK: Status: ACTIVE | Noted: 2018-10-31

## 2018-10-31 PROBLEM — I10 ESSENTIAL (PRIMARY) HYPERTENSION: Status: ACTIVE | Noted: 2018-10-31

## 2018-10-31 PROBLEM — D22.71 MELANOCYTIC NEVI OF RIGHT LOWER LIMB, INCLUDING HIP: Status: ACTIVE | Noted: 2018-10-31

## 2018-10-31 PROBLEM — D22.61 MELANOCYTIC NEVI OF RIGHT UPPER LIMB, INCLUDING SHOULDER: Status: ACTIVE | Noted: 2018-10-31

## 2018-10-31 PROCEDURE — 17003 DESTRUCT PREMALG LES 2-14: CPT

## 2018-10-31 PROCEDURE — 17000 DESTRUCT PREMALG LESION: CPT

## 2018-10-31 PROCEDURE — 99203 OFFICE O/P NEW LOW 30 MIN: CPT | Mod: 25

## 2018-10-31 PROCEDURE — ? COUNSELING

## 2018-10-31 PROCEDURE — ? LIQUID NITROGEN

## 2018-10-31 ASSESSMENT — LOCATION DETAILED DESCRIPTION DERM
LOCATION DETAILED: RIGHT POPLITEAL SKIN
LOCATION DETAILED: LEFT LATERAL PROXIMAL PRETIBIAL REGION
LOCATION DETAILED: RIGHT INFERIOR CENTRAL MALAR CHEEK
LOCATION DETAILED: LEFT PROXIMAL PRETIBIAL REGION
LOCATION DETAILED: LEFT ANTERIOR DISTAL UPPER ARM
LOCATION DETAILED: LEFT INFRAMAMMARY CREASE (INNER QUADRANT)
LOCATION DETAILED: NASAL DORSUM
LOCATION DETAILED: LEFT SUPERIOR MEDIAL MIDBACK
LOCATION DETAILED: RIGHT ANTECUBITAL SKIN
LOCATION DETAILED: LEFT AXILLARY VAULT
LOCATION DETAILED: LEFT DISTAL POSTERIOR THIGH
LOCATION DETAILED: RIGHT DISTAL POSTERIOR THIGH
LOCATION DETAILED: RIGHT VENTRAL PROXIMAL FOREARM
LOCATION DETAILED: RIGHT SUPERIOR LATERAL MALAR CHEEK
LOCATION DETAILED: INFERIOR THORACIC SPINE
LOCATION DETAILED: LEFT VENTRAL LATERAL PROXIMAL FOREARM
LOCATION DETAILED: RIGHT MEDIAL SUPERIOR CHEST
LOCATION DETAILED: RIGHT PROXIMAL PRETIBIAL REGION
LOCATION DETAILED: LEFT VENTRAL PROXIMAL FOREARM
LOCATION DETAILED: RIGHT ANTERIOR PROXIMAL UPPER ARM
LOCATION DETAILED: LEFT INFERIOR MEDIAL UPPER BACK
LOCATION DETAILED: SUBXIPHOID
LOCATION DETAILED: LEFT INFERIOR LATERAL NECK
LOCATION DETAILED: RIGHT INFERIOR MEDIAL FOREHEAD
LOCATION DETAILED: NASAL TIP
LOCATION DETAILED: XIPHOID
LOCATION DETAILED: RIGHT AXILLARY VAULT
LOCATION DETAILED: RIGHT ANTERIOR DISTAL UPPER ARM
LOCATION DETAILED: MIDDLE STERNUM
LOCATION DETAILED: RIGHT MEDIAL MALAR CHEEK
LOCATION DETAILED: LEFT ANTERIOR PROXIMAL UPPER ARM
LOCATION DETAILED: LEFT POPLITEAL SKIN
LOCATION DETAILED: LEFT INFERIOR LATERAL MIDBACK

## 2018-10-31 ASSESSMENT — LOCATION SIMPLE DESCRIPTION DERM
LOCATION SIMPLE: LEFT PRETIBIAL REGION
LOCATION SIMPLE: RIGHT AXILLARY VAULT
LOCATION SIMPLE: LEFT FOREARM
LOCATION SIMPLE: LEFT UPPER ARM
LOCATION SIMPLE: RIGHT POSTERIOR THIGH
LOCATION SIMPLE: RIGHT UPPER ARM
LOCATION SIMPLE: ABDOMEN
LOCATION SIMPLE: LEFT LOWER BACK
LOCATION SIMPLE: UPPER BACK
LOCATION SIMPLE: RIGHT FOREHEAD
LOCATION SIMPLE: RIGHT POPLITEAL SKIN
LOCATION SIMPLE: RIGHT CHEEK
LOCATION SIMPLE: NOSE
LOCATION SIMPLE: LEFT UPPER BACK
LOCATION SIMPLE: CHEST
LOCATION SIMPLE: LEFT POSTERIOR THIGH
LOCATION SIMPLE: LEFT AXILLARY VAULT
LOCATION SIMPLE: RIGHT PRETIBIAL REGION
LOCATION SIMPLE: LEFT ANTERIOR NECK
LOCATION SIMPLE: LEFT POPLITEAL SKIN
LOCATION SIMPLE: LEFT BREAST
LOCATION SIMPLE: RIGHT FOREARM

## 2018-10-31 ASSESSMENT — LOCATION ZONE DERM
LOCATION ZONE: TRUNK
LOCATION ZONE: NOSE
LOCATION ZONE: AXILLAE
LOCATION ZONE: FACE
LOCATION ZONE: LEG
LOCATION ZONE: ARM
LOCATION ZONE: NECK

## 2020-09-02 ENCOUNTER — APPOINTMENT (RX ONLY)
Dept: URBAN - NONMETROPOLITAN AREA CLINIC 15 | Facility: CLINIC | Age: 65
Setting detail: DERMATOLOGY
End: 2020-09-02

## 2020-09-02 DIAGNOSIS — L82.1 OTHER SEBORRHEIC KERATOSIS: ICD-10-CM

## 2020-09-02 DIAGNOSIS — D22 MELANOCYTIC NEVI: ICD-10-CM

## 2020-09-02 DIAGNOSIS — L91.8 OTHER HYPERTROPHIC DISORDERS OF THE SKIN: ICD-10-CM

## 2020-09-02 DIAGNOSIS — L57.0 ACTINIC KERATOSIS: ICD-10-CM

## 2020-09-02 DIAGNOSIS — D18.0 HEMANGIOMA: ICD-10-CM

## 2020-09-02 DIAGNOSIS — L81.4 OTHER MELANIN HYPERPIGMENTATION: ICD-10-CM

## 2020-09-02 PROBLEM — D22.61 MELANOCYTIC NEVI OF RIGHT UPPER LIMB, INCLUDING SHOULDER: Status: ACTIVE | Noted: 2020-09-02

## 2020-09-02 PROBLEM — D22.72 MELANOCYTIC NEVI OF LEFT LOWER LIMB, INCLUDING HIP: Status: ACTIVE | Noted: 2020-09-02

## 2020-09-02 PROBLEM — D22.71 MELANOCYTIC NEVI OF RIGHT LOWER LIMB, INCLUDING HIP: Status: ACTIVE | Noted: 2020-09-02

## 2020-09-02 PROBLEM — D22.62 MELANOCYTIC NEVI OF LEFT UPPER LIMB, INCLUDING SHOULDER: Status: ACTIVE | Noted: 2020-09-02

## 2020-09-02 PROBLEM — D18.01 HEMANGIOMA OF SKIN AND SUBCUTANEOUS TISSUE: Status: ACTIVE | Noted: 2020-09-02

## 2020-09-02 PROBLEM — D22.5 MELANOCYTIC NEVI OF TRUNK: Status: ACTIVE | Noted: 2020-09-02

## 2020-09-02 PROCEDURE — ? COUNSELING

## 2020-09-02 PROCEDURE — ? LIQUID NITROGEN

## 2020-09-02 PROCEDURE — 17000 DESTRUCT PREMALG LESION: CPT

## 2020-09-02 PROCEDURE — 99213 OFFICE O/P EST LOW 20 MIN: CPT | Mod: 25

## 2020-09-02 ASSESSMENT — LOCATION ZONE DERM
LOCATION ZONE: ARM
LOCATION ZONE: FACE
LOCATION ZONE: AXILLAE
LOCATION ZONE: ARM
LOCATION ZONE: TRUNK
LOCATION ZONE: NOSE
LOCATION ZONE: EAR
LOCATION ZONE: LEG
LOCATION ZONE: HAND

## 2020-09-02 ASSESSMENT — LOCATION DETAILED DESCRIPTION DERM
LOCATION DETAILED: LEFT SCAPHA
LOCATION DETAILED: RIGHT AXILLARY VAULT
LOCATION DETAILED: RIGHT SUPERIOR HELIX
LOCATION DETAILED: LEFT INFERIOR CENTRAL MALAR CHEEK
LOCATION DETAILED: LEFT RADIAL DORSAL HAND
LOCATION DETAILED: RIGHT PROXIMAL CALF
LOCATION DETAILED: LEFT VENTRAL DISTAL FOREARM
LOCATION DETAILED: LEFT POPLITEAL SKIN
LOCATION DETAILED: RIGHT MEDIAL INFERIOR CHEST
LOCATION DETAILED: LEFT NASAL DORSUM
LOCATION DETAILED: RIGHT INFERIOR UPPER BACK
LOCATION DETAILED: LEFT ANTECUBITAL SKIN
LOCATION DETAILED: LEFT AXILLARY VAULT
LOCATION DETAILED: PERIUMBILICAL SKIN
LOCATION DETAILED: LEFT MEDIAL FOREHEAD
LOCATION DETAILED: RIGHT MEDIAL UPPER BACK
LOCATION DETAILED: RIGHT VENTRAL PROXIMAL FOREARM
LOCATION DETAILED: LEFT DISTAL POSTERIOR THIGH
LOCATION DETAILED: RIGHT POPLITEAL SKIN
LOCATION DETAILED: EPIGASTRIC SKIN
LOCATION DETAILED: LEFT ANTERIOR DISTAL UPPER ARM
LOCATION DETAILED: RIGHT RADIAL DORSAL HAND
LOCATION DETAILED: XIPHOID
LOCATION DETAILED: RIGHT DISTAL POSTERIOR THIGH
LOCATION DETAILED: LEFT SUPERIOR MEDIAL MIDBACK
LOCATION DETAILED: RIGHT ANTERIOR PROXIMAL UPPER ARM
LOCATION DETAILED: RIGHT VENTRAL DISTAL FOREARM
LOCATION DETAILED: LEFT VENTRAL DISTAL FOREARM
LOCATION DETAILED: LEFT PROXIMAL CALF
LOCATION DETAILED: RIGHT ANTERIOR DISTAL UPPER ARM
LOCATION DETAILED: RIGHT INFERIOR MEDIAL MIDBACK

## 2020-09-02 ASSESSMENT — LOCATION SIMPLE DESCRIPTION DERM
LOCATION SIMPLE: LEFT HAND
LOCATION SIMPLE: RIGHT AXILLARY VAULT
LOCATION SIMPLE: LEFT LOWER BACK
LOCATION SIMPLE: RIGHT FOREARM
LOCATION SIMPLE: RIGHT UPPER ARM
LOCATION SIMPLE: LEFT AXILLARY VAULT
LOCATION SIMPLE: RIGHT POSTERIOR THIGH
LOCATION SIMPLE: LEFT CHEEK
LOCATION SIMPLE: LEFT FOREHEAD
LOCATION SIMPLE: LEFT EAR
LOCATION SIMPLE: NOSE
LOCATION SIMPLE: LEFT CALF
LOCATION SIMPLE: LEFT ELBOW
LOCATION SIMPLE: RIGHT EAR
LOCATION SIMPLE: LEFT FOREARM
LOCATION SIMPLE: RIGHT UPPER BACK
LOCATION SIMPLE: LEFT FOREARM
LOCATION SIMPLE: CHEST
LOCATION SIMPLE: RIGHT HAND
LOCATION SIMPLE: LEFT POSTERIOR THIGH
LOCATION SIMPLE: RIGHT LOWER BACK
LOCATION SIMPLE: RIGHT CALF
LOCATION SIMPLE: LEFT UPPER ARM
LOCATION SIMPLE: LEFT POPLITEAL SKIN
LOCATION SIMPLE: ABDOMEN
LOCATION SIMPLE: RIGHT POPLITEAL SKIN

## 2022-03-04 ENCOUNTER — HOSPITAL ENCOUNTER (OUTPATIENT)
Facility: MEDICAL CENTER | Age: 67
End: 2022-03-04
Attending: PHYSICIAN ASSISTANT
Payer: MEDICARE

## 2022-03-04 ENCOUNTER — OFFICE VISIT (OUTPATIENT)
Dept: URGENT CARE | Facility: PHYSICIAN GROUP | Age: 67
End: 2022-03-04
Payer: MEDICARE

## 2022-03-04 VITALS
TEMPERATURE: 98.3 F | DIASTOLIC BLOOD PRESSURE: 78 MMHG | RESPIRATION RATE: 16 BRPM | SYSTOLIC BLOOD PRESSURE: 114 MMHG | WEIGHT: 162 LBS | BODY MASS INDEX: 29.81 KG/M2 | HEART RATE: 92 BPM | HEIGHT: 62 IN | OXYGEN SATURATION: 97 %

## 2022-03-04 DIAGNOSIS — J01.90 ACUTE NON-RECURRENT SINUSITIS, UNSPECIFIED LOCATION: ICD-10-CM

## 2022-03-04 DIAGNOSIS — Z86.69 HISTORY OF EUSTACHIAN TUBE DYSFUNCTION: ICD-10-CM

## 2022-03-04 DIAGNOSIS — H65.02 NON-RECURRENT ACUTE SEROUS OTITIS MEDIA OF LEFT EAR: ICD-10-CM

## 2022-03-04 LAB — COVID ORDER STATUS COVID19: NORMAL

## 2022-03-04 PROCEDURE — U0005 INFEC AGEN DETEC AMPLI PROBE: HCPCS

## 2022-03-04 PROCEDURE — 99214 OFFICE O/P EST MOD 30 MIN: CPT | Performed by: PHYSICIAN ASSISTANT

## 2022-03-04 PROCEDURE — U0003 INFECTIOUS AGENT DETECTION BY NUCLEIC ACID (DNA OR RNA); SEVERE ACUTE RESPIRATORY SYNDROME CORONAVIRUS 2 (SARS-COV-2) (CORONAVIRUS DISEASE [COVID-19]), AMPLIFIED PROBE TECHNIQUE, MAKING USE OF HIGH THROUGHPUT TECHNOLOGIES AS DESCRIBED BY CMS-2020-01-R: HCPCS

## 2022-03-04 RX ORDER — FLUTICASONE PROPIONATE 50 MCG
1 SPRAY, SUSPENSION (ML) NASAL DAILY
Qty: 16 G | Refills: 0 | Status: SHIPPED | OUTPATIENT
Start: 2022-03-04

## 2022-03-04 RX ORDER — AMOXICILLIN AND CLAVULANATE POTASSIUM 875; 125 MG/1; MG/1
1 TABLET, FILM COATED ORAL 2 TIMES DAILY
Qty: 14 TABLET | Refills: 0 | Status: SHIPPED | OUTPATIENT
Start: 2022-03-04 | End: 2022-03-11

## 2022-03-04 ASSESSMENT — ENCOUNTER SYMPTOMS
HEADACHES: 1
SINUS PRESSURE: 1
SORE THROAT: 0
COUGH: 1
HOARSE VOICE: 1

## 2022-03-04 NOTE — PROGRESS NOTES
Subjective:   uYko Boo is a 66 y.o. female who presents for Sinusitis (X 1 week)        Home test was negative for Covid 19.    Sinusitis  This is a new problem. The current episode started 1 to 4 weeks ago. The problem has been gradually worsening since onset. There has been no fever. The pain is moderate. Associated symptoms include congestion (thick green d/c), coughing, ear pain (bilateral), headaches, a hoarse voice and sinus pressure (frontal and maxillary). Pertinent negatives include no sore throat. Treatments tried: chlorcedin. The treatment provided mild relief.     Review of Systems   HENT: Positive for congestion (thick green d/c), ear pain (bilateral), hoarse voice and sinus pressure (frontal and maxillary). Negative for sore throat.    Respiratory: Positive for cough.    Neurological: Positive for headaches.       PMH:  has a past medical history of Diabetes (HCC), Heart burn, Hypertension, Postmenopausal disorder, and Snoring.  MEDS:   Current Outpatient Medications:   •  metFORMIN (GLUCOPHAGE) 500 MG Tab, Take 500 mg by mouth 2 times a day with meals., Disp: , Rfl:   •  IRBESARTAN PO, Take  by mouth., Disp: , Rfl:   •  amoxicillin-clavulanate (AUGMENTIN) 875-125 MG Tab, Take 1 Tablet by mouth 2 times a day for 7 days., Disp: 14 Tablet, Rfl: 0  •  fluticasone (FLONASE) 50 MCG/ACT nasal spray, Administer 1 Spray into affected nostril(S) every day., Disp: 16 g, Rfl: 0  •  amlodipine (NORVASC) 5 MG Tab, Take 5 mg by mouth every morning., Disp: , Rfl:   •  triamterene-hctz (MAXZIDE-25/DYAZIDE) 37.5-25 MG TABS, Take 1 Tab by mouth every day., Disp: 90 Each, Rfl: 3  ALLERGIES: No Known Allergies  SURGHX:   Past Surgical History:   Procedure Laterality Date   • ORTHOPEDIC OSTEOTOMY Right 2/2/2017    Procedure: ORTHOPEDIC OSTEOTOMY - GREAT TOE W/AUTOGRAFT;  Surgeon: Aldo Antonio D.P.M.;  Location: SURGERY [facility];  Service:    • BUNIONECTOMY  2/2/2017    Procedure: BUNIONECTOMY -  "HEMA ;  Surgeon: Aldo Antonio D.P.M.;  Location: SURGERY [facility];  Service:    • ROTATOR CUFF REPAIR Bilateral 2010?     SOCHX:  reports that she has never smoked. She has never used smokeless tobacco. She reports that she does not drink alcohol and does not use drugs.  FH: Family history was reviewed, no pertinent findings to report   Objective:   /78   Pulse 92   Temp 36.8 °C (98.3 °F) (Temporal)   Resp 16   Ht 1.562 m (5' 1.5\")   Wt 73.5 kg (162 lb)   SpO2 97%   BMI 30.11 kg/m²   Physical Exam  Vitals reviewed.   Constitutional:       General: She is not in acute distress.     Appearance: Normal appearance. She is well-developed. She is not toxic-appearing.   HENT:      Head: Normocephalic and atraumatic.      Right Ear: Tympanic membrane, ear canal and external ear normal.      Left Ear: Ear canal and external ear normal. Tympanic membrane is injected and bulging. Tympanic membrane has decreased mobility.      Nose: Mucosal edema and congestion present.      Right Sinus: Maxillary sinus tenderness and frontal sinus tenderness present.      Left Sinus: Maxillary sinus tenderness and frontal sinus tenderness present.   Cardiovascular:      Rate and Rhythm: Normal rate and regular rhythm.      Heart sounds: Normal heart sounds, S1 normal and S2 normal.   Pulmonary:      Effort: Pulmonary effort is normal. No respiratory distress.      Breath sounds: Normal breath sounds. No stridor. No decreased breath sounds, wheezing, rhonchi or rales.   Skin:     General: Skin is dry.   Neurological:      Comments: Alert and oriented.    Psychiatric:         Speech: Speech normal.         Behavior: Behavior normal.           Assessment/Plan:   1. Acute non-recurrent sinusitis, unspecified location  - amoxicillin-clavulanate (AUGMENTIN) 875-125 MG Tab; Take 1 Tablet by mouth 2 times a day for 7 days.  Dispense: 14 Tablet; Refill: 0  - fluticasone (FLONASE) 50 MCG/ACT nasal spray; Administer 1 Spray " into affected nostril(S) every day.  Dispense: 16 g; Refill: 0  - SARS-CoV-2 PCR (24 hour In-House): Collect NP swab in VTM; Future    2. Non-recurrent acute serous otitis media of left ear  - amoxicillin-clavulanate (AUGMENTIN) 875-125 MG Tab; Take 1 Tablet by mouth 2 times a day for 7 days.  Dispense: 14 Tablet; Refill: 0  - fluticasone (FLONASE) 50 MCG/ACT nasal spray; Administer 1 Spray into affected nostril(S) every day.  Dispense: 16 g; Refill: 0  - SARS-CoV-2 PCR (24 hour In-House): Collect NP swab in VTM; Future    3. History of eustachian tube dysfunction    Other orders  - metFORMIN (GLUCOPHAGE) 500 MG Tab; Take 500 mg by mouth 2 times a day with meals.  - IRBESARTAN PO; Take  by mouth.      Pt instructed to complete full course of medication despite symptomatic improvement. Pt to take med with meals to alleviate GI upset. Pt to take a probiotic or eat Alejandra’s yogurt/ kefir while taking the medication.    Flonase 1 squirt in each nostril, as instructed in clinic, once a day.  Use nasal saline TID to promote drainage.   Salt water gurgles to soothe sore throat.  Ayr saline gel to moisturize nasal passage and prevent bleeding.  If you fail to improve in 3-5 days or symptoms worsen/new symptoms develop, RTC for reevaluation.    Differential diagnosis, natural history, supportive care, and indications for immediate follow-up discussed.

## 2022-03-05 LAB
SARS-COV-2 RNA RESP QL NAA+PROBE: NOTDETECTED
SPECIMEN SOURCE: NORMAL

## 2023-12-20 ENCOUNTER — APPOINTMENT (RX ONLY)
Dept: URBAN - NONMETROPOLITAN AREA CLINIC 15 | Facility: CLINIC | Age: 68
Setting detail: DERMATOLOGY
End: 2023-12-20

## 2023-12-20 DIAGNOSIS — D18.0 HEMANGIOMA: ICD-10-CM

## 2023-12-20 DIAGNOSIS — L85.8 OTHER SPECIFIED EPIDERMAL THICKENING: ICD-10-CM

## 2023-12-20 DIAGNOSIS — L81.4 OTHER MELANIN HYPERPIGMENTATION: ICD-10-CM

## 2023-12-20 DIAGNOSIS — L57.0 ACTINIC KERATOSIS: ICD-10-CM

## 2023-12-20 DIAGNOSIS — L82.1 OTHER SEBORRHEIC KERATOSIS: ICD-10-CM

## 2023-12-20 DIAGNOSIS — D22 MELANOCYTIC NEVI: ICD-10-CM

## 2023-12-20 PROBLEM — D22.72 MELANOCYTIC NEVI OF LEFT LOWER LIMB, INCLUDING HIP: Status: ACTIVE | Noted: 2023-12-20

## 2023-12-20 PROBLEM — D48.5 NEOPLASM OF UNCERTAIN BEHAVIOR OF SKIN: Status: ACTIVE | Noted: 2023-12-20

## 2023-12-20 PROBLEM — D18.01 HEMANGIOMA OF SKIN AND SUBCUTANEOUS TISSUE: Status: ACTIVE | Noted: 2023-12-20

## 2023-12-20 PROBLEM — D22.71 MELANOCYTIC NEVI OF RIGHT LOWER LIMB, INCLUDING HIP: Status: ACTIVE | Noted: 2023-12-20

## 2023-12-20 PROBLEM — D22.5 MELANOCYTIC NEVI OF TRUNK: Status: ACTIVE | Noted: 2023-12-20

## 2023-12-20 PROBLEM — D22.61 MELANOCYTIC NEVI OF RIGHT UPPER LIMB, INCLUDING SHOULDER: Status: ACTIVE | Noted: 2023-12-20

## 2023-12-20 PROBLEM — D22.62 MELANOCYTIC NEVI OF LEFT UPPER LIMB, INCLUDING SHOULDER: Status: ACTIVE | Noted: 2023-12-20

## 2023-12-20 PROCEDURE — 17003 DESTRUCT PREMALG LES 2-14: CPT

## 2023-12-20 PROCEDURE — 17000 DESTRUCT PREMALG LESION: CPT | Mod: 59

## 2023-12-20 PROCEDURE — ? LIQUID NITROGEN

## 2023-12-20 PROCEDURE — ? BIOPSY BY SHAVE METHOD

## 2023-12-20 PROCEDURE — 11102 TANGNTL BX SKIN SINGLE LES: CPT

## 2023-12-20 PROCEDURE — ? COUNSELING

## 2023-12-20 PROCEDURE — 99203 OFFICE O/P NEW LOW 30 MIN: CPT | Mod: 25

## 2023-12-20 ASSESSMENT — LOCATION DETAILED DESCRIPTION DERM
LOCATION DETAILED: RIGHT UPPER CUTANEOUS LIP
LOCATION DETAILED: RIGHT ANTECUBITAL SKIN
LOCATION DETAILED: LEFT DISTAL POSTERIOR THIGH
LOCATION DETAILED: RIGHT POPLITEAL SKIN
LOCATION DETAILED: LEFT VENTRAL PROXIMAL FOREARM
LOCATION DETAILED: LEFT NASAL SIDEWALL
LOCATION DETAILED: LEFT POPLITEAL SKIN
LOCATION DETAILED: LEFT FOREHEAD
LOCATION DETAILED: LEFT VENTRAL LATERAL PROXIMAL FOREARM
LOCATION DETAILED: RIGHT INFERIOR CENTRAL MALAR CHEEK
LOCATION DETAILED: LEFT ANTERIOR PROXIMAL UPPER ARM
LOCATION DETAILED: RIGHT ANTERIOR DISTAL UPPER ARM
LOCATION DETAILED: RIGHT DISTAL POSTERIOR THIGH
LOCATION DETAILED: XIPHOID
LOCATION DETAILED: RIGHT PROXIMAL PRETIBIAL REGION
LOCATION DETAILED: INFERIOR THORACIC SPINE
LOCATION DETAILED: LEFT CENTRAL EYEBROW
LOCATION DETAILED: NASAL SUPRATIP
LOCATION DETAILED: RIGHT MEDIAL SUPERIOR CHEST
LOCATION DETAILED: LEFT PROXIMAL PRETIBIAL REGION
LOCATION DETAILED: LEFT INFERIOR LATERAL MIDBACK
LOCATION DETAILED: RIGHT INFERIOR MEDIAL FOREHEAD
LOCATION DETAILED: LEFT SUPERIOR MEDIAL MIDBACK
LOCATION DETAILED: RIGHT VENTRAL PROXIMAL FOREARM
LOCATION DETAILED: RIGHT ANTERIOR PROXIMAL UPPER ARM
LOCATION DETAILED: LEFT ANTERIOR DISTAL UPPER ARM
LOCATION DETAILED: LEFT MEDIAL MALAR CHEEK
LOCATION DETAILED: LEFT INFERIOR MEDIAL UPPER BACK
LOCATION DETAILED: MIDDLE STERNUM
LOCATION DETAILED: LEFT LATERAL PROXIMAL PRETIBIAL REGION
LOCATION DETAILED: SUBXIPHOID

## 2023-12-20 ASSESSMENT — LOCATION SIMPLE DESCRIPTION DERM
LOCATION SIMPLE: LEFT UPPER BACK
LOCATION SIMPLE: LEFT FOREARM
LOCATION SIMPLE: UPPER BACK
LOCATION SIMPLE: RIGHT FOREHEAD
LOCATION SIMPLE: RIGHT POPLITEAL SKIN
LOCATION SIMPLE: LEFT PRETIBIAL REGION
LOCATION SIMPLE: RIGHT FOREARM
LOCATION SIMPLE: LEFT UPPER ARM
LOCATION SIMPLE: LEFT EYEBROW
LOCATION SIMPLE: RIGHT PRETIBIAL REGION
LOCATION SIMPLE: LEFT CHEEK
LOCATION SIMPLE: NOSE
LOCATION SIMPLE: RIGHT LIP
LOCATION SIMPLE: RIGHT UPPER ARM
LOCATION SIMPLE: LEFT POPLITEAL SKIN
LOCATION SIMPLE: LEFT FOREHEAD
LOCATION SIMPLE: CHEST
LOCATION SIMPLE: LEFT NOSE
LOCATION SIMPLE: ABDOMEN
LOCATION SIMPLE: RIGHT POSTERIOR THIGH
LOCATION SIMPLE: RIGHT CHEEK
LOCATION SIMPLE: LEFT POSTERIOR THIGH
LOCATION SIMPLE: LEFT LOWER BACK

## 2023-12-20 ASSESSMENT — LOCATION ZONE DERM
LOCATION ZONE: FACE
LOCATION ZONE: ARM
LOCATION ZONE: LEG
LOCATION ZONE: NOSE
LOCATION ZONE: TRUNK
LOCATION ZONE: LIP

## 2023-12-20 NOTE — PROCEDURE: BIOPSY BY SHAVE METHOD
Detail Level: Detailed
Depth Of Biopsy: dermis
Was A Bandage Applied: Yes
Size Of Lesion In Cm: 0.8
X Size Of Lesion In Cm: 0
Biopsy Type: H and E
Biopsy Method: Personna blade
Anesthesia Type: 0.05% lidocaine without epinephrine
Anesthesia Volume In Cc: 1.5
Hemostasis: Electrocautery
Wound Care: Vaseline
Dressing: Band-Aid
Destruction After The Procedure: No
Type Of Destruction Used: Electrodesiccation
Curettage Text: The wound bed was treated with curettage after the biopsy was performed.
Cryotherapy Text: The wound bed was treated with cryotherapy after the biopsy was performed.
Electrodesiccation Text: The wound bed was treated with electrodesiccation after the biopsy was performed.
Electrodesiccation And Curettage Text: The wound bed was treated with electrodesiccation and curettage after the biopsy was performed.
Silver Nitrate Text: The wound bed was treated with silver nitrate after the biopsy was performed.
Lab: [id number]
Lab Facility: 
Consent: Written consent was obtained and risks were reviewed including but not limited to scarring, infection, bleeding, scabbing, incomplete removal, nerve damage and allergy to anesthesia.
Post-Care Instructions: I reviewed with the patient in detail post-care instructions. Patient is to keep the biopsy site dry overnight, and then apply bacitracin/petroleum  twice daily until healed. Patient may apply hydrogen peroxide soaks to remove any crusting.
Notification Instructions: Patient will be notified of biopsy results. However, patient instructed to call the office if not contacted within 2 weeks.
Billing Type: Third-Party Bill
Information: Selecting Yes will display possible errors in your note based on the variables you have selected. This validation is only offered as a suggestion for you. PLEASE NOTE THAT THE VALIDATION TEXT WILL BE REMOVED WHEN YOU FINALIZE YOUR NOTE. IF YOU WANT TO FAX A PRELIMINARY NOTE YOU WILL NEED TO TOGGLE THIS TO 'NO' IF YOU DO NOT WANT IT IN YOUR FAXED NOTE.

## 2023-12-22 ENCOUNTER — RX ONLY (OUTPATIENT)
Age: 68
Setting detail: RX ONLY
End: 2023-12-22

## 2023-12-22 RX ORDER — FLUOROURACIL 5 MG/G
CREAM TOPICAL
Qty: 40 | Refills: 0 | Status: ERX | COMMUNITY
Start: 2023-12-22

## 2024-02-20 ENCOUNTER — APPOINTMENT (RX ONLY)
Dept: URBAN - NONMETROPOLITAN AREA CLINIC 15 | Facility: CLINIC | Age: 69
Setting detail: DERMATOLOGY
End: 2024-02-20

## 2024-02-20 DIAGNOSIS — Z09 ENCOUNTER FOR FOLLOW-UP EXAMINATION AFTER COMPLETED TREATMENT FOR CONDITIONS OTHER THAN MALIGNANT NEOPLASM: ICD-10-CM

## 2024-02-20 DIAGNOSIS — L81.4 OTHER MELANIN HYPERPIGMENTATION: ICD-10-CM

## 2024-02-20 DIAGNOSIS — Z86.007 PERSONAL HISTORY OF IN-SITU NEOPLASM OF SKIN: ICD-10-CM

## 2024-02-20 DIAGNOSIS — L57.0 ACTINIC KERATOSIS: ICD-10-CM

## 2024-02-20 PROCEDURE — 17000 DESTRUCT PREMALG LESION: CPT

## 2024-02-20 PROCEDURE — 99212 OFFICE O/P EST SF 10 MIN: CPT | Mod: 25

## 2024-02-20 PROCEDURE — ? LIQUID NITROGEN (COSMETIC)

## 2024-02-20 PROCEDURE — ? COUNSELING

## 2024-02-20 PROCEDURE — ? OBSERVATION

## 2024-02-20 PROCEDURE — ? LIQUID NITROGEN

## 2024-02-20 ASSESSMENT — LOCATION SIMPLE DESCRIPTION DERM: LOCATION SIMPLE: NOSE

## 2024-02-20 ASSESSMENT — LOCATION DETAILED DESCRIPTION DERM
LOCATION DETAILED: NASAL DORSUM
LOCATION DETAILED: NASAL SUPRATIP
LOCATION DETAILED: NASAL TIP

## 2024-02-20 ASSESSMENT — LOCATION ZONE DERM: LOCATION ZONE: NOSE

## 2024-02-20 NOTE — PROCEDURE: LIQUID NITROGEN (COSMETIC)
VACCINE ADMINISTRATION RECORD  PARENT / GUARDIAN APPROVAL  Date: 2022  Vaccine administered to: Regine Quiroga     : 2022    MRN: [id number]    A copy of the appropriate Centers for Disease Control and Prevention Vaccine Information statement has been provided. I have read or have had explained the information about the diseases and the vaccines listed below. There was an opportunity to ask questions and any questions were answered satisfactorily. I believe that I understand the benefits and risks of the vaccine cited and ask that the vaccine(s) listed below be given to me or to the person named above (for whom I am authorized to make this request). VACCINES ADMINISTERED:  Prevnar   and MMR      I have read and hereby agree to be bound by the terms of this agreement as stated above. My signature is valid until revoked by me in writing. This document is signed by, relationship: Parents on 2022.:                                                                                                  2022                                Parent / Quentin Radha Signature                                                Date    Bettina Vargas served as a witness to authentication that the identity of the person signing electronically is in fact the person represented as signing. This document was generated by Oscar Powell MA on 2022.
Billing Information: Bill by Static [employer]
Spray Paint Text: The liquid nitrogen was applied to the skin utilizing a spray paint frosting technique.
Show Spray Paint Technique Variable?: Yes
Post-Care Instructions: I reviewed with the patient in detail post-care instructions. Patient is to wear sunprotection, and avoid picking at any of the treated lesions. Pt may apply Vaseline to crusted or scabbing areas.
Price (Use Numbers Only, No Special Characters Or $): 0.00
Render Post-Care Instructions In Note?: no
Detail Level: Detailed
Consent: The patient's consent was obtained including but not limited to risks of crusting, scabbing, blistering, scarring, darker or lighter pigmentary change, recurrence, incomplete removal and infection. The patient understands that the procedure is cosmetic in nature and is not covered by insurance.

## 2024-08-27 ENCOUNTER — APPOINTMENT (RX ONLY)
Dept: URBAN - NONMETROPOLITAN AREA CLINIC 15 | Facility: CLINIC | Age: 69
Setting detail: DERMATOLOGY
End: 2024-08-27

## 2024-08-27 DIAGNOSIS — L82.1 OTHER SEBORRHEIC KERATOSIS: ICD-10-CM

## 2024-08-27 DIAGNOSIS — Z71.89 OTHER SPECIFIED COUNSELING: ICD-10-CM

## 2024-08-27 DIAGNOSIS — D22 MELANOCYTIC NEVI: ICD-10-CM

## 2024-08-27 DIAGNOSIS — D18.0 HEMANGIOMA: ICD-10-CM

## 2024-08-27 DIAGNOSIS — L57.0 ACTINIC KERATOSIS: ICD-10-CM

## 2024-08-27 DIAGNOSIS — Z86.007 PERSONAL HISTORY OF IN-SITU NEOPLASM OF SKIN: ICD-10-CM

## 2024-08-27 DIAGNOSIS — L81.4 OTHER MELANIN HYPERPIGMENTATION: ICD-10-CM

## 2024-08-27 PROBLEM — D22.62 MELANOCYTIC NEVI OF LEFT UPPER LIMB, INCLUDING SHOULDER: Status: ACTIVE | Noted: 2024-08-27

## 2024-08-27 PROBLEM — D22.72 MELANOCYTIC NEVI OF LEFT LOWER LIMB, INCLUDING HIP: Status: ACTIVE | Noted: 2024-08-27

## 2024-08-27 PROBLEM — D22.71 MELANOCYTIC NEVI OF RIGHT LOWER LIMB, INCLUDING HIP: Status: ACTIVE | Noted: 2024-08-27

## 2024-08-27 PROBLEM — D22.5 MELANOCYTIC NEVI OF TRUNK: Status: ACTIVE | Noted: 2024-08-27

## 2024-08-27 PROBLEM — D22.61 MELANOCYTIC NEVI OF RIGHT UPPER LIMB, INCLUDING SHOULDER: Status: ACTIVE | Noted: 2024-08-27

## 2024-08-27 PROBLEM — D22.39 MELANOCYTIC NEVI OF OTHER PARTS OF FACE: Status: ACTIVE | Noted: 2024-08-27

## 2024-08-27 PROBLEM — D18.01 HEMANGIOMA OF SKIN AND SUBCUTANEOUS TISSUE: Status: ACTIVE | Noted: 2024-08-27

## 2024-08-27 PROCEDURE — ? COUNSELING

## 2024-08-27 PROCEDURE — ? OBSERVATION

## 2024-08-27 PROCEDURE — ? LIQUID NITROGEN

## 2024-08-27 PROCEDURE — 17000 DESTRUCT PREMALG LESION: CPT

## 2024-08-27 PROCEDURE — 99213 OFFICE O/P EST LOW 20 MIN: CPT | Mod: 25

## 2024-08-27 PROCEDURE — ? PHOTO-DOCUMENTATION

## 2024-08-27 ASSESSMENT — LOCATION DETAILED DESCRIPTION DERM
LOCATION DETAILED: MIDDLE STERNUM
LOCATION DETAILED: RIGHT DISTAL POSTERIOR THIGH
LOCATION DETAILED: LEFT PROXIMAL PRETIBIAL REGION
LOCATION DETAILED: NASAL DORSUM
LOCATION DETAILED: LEFT SUPERIOR CENTRAL MALAR CHEEK
LOCATION DETAILED: NASAL SUPRATIP
LOCATION DETAILED: RIGHT INFERIOR UPPER BACK
LOCATION DETAILED: RIGHT CENTRAL EYEBROW
LOCATION DETAILED: LOWER STERNUM
LOCATION DETAILED: RIGHT SUPERIOR UPPER BACK
LOCATION DETAILED: RIGHT MEDIAL BREAST 1-2:00 REGION
LOCATION DETAILED: RIGHT ANTERIOR PROXIMAL THIGH
LOCATION DETAILED: SUBXIPHOID
LOCATION DETAILED: RIGHT PROXIMAL DORSAL FOREARM
LOCATION DETAILED: LEFT VENTRAL LATERAL PROXIMAL FOREARM
LOCATION DETAILED: LEFT DISTAL POSTERIOR THIGH
LOCATION DETAILED: RIGHT ANTERIOR DISTAL THIGH
LOCATION DETAILED: LEFT DISTAL DORSAL FOREARM
LOCATION DETAILED: RIGHT DISTAL POSTERIOR UPPER ARM
LOCATION DETAILED: RIGHT SUPERIOR MEDIAL BUCCAL CHEEK
LOCATION DETAILED: RIGHT PROXIMAL POSTERIOR UPPER ARM
LOCATION DETAILED: LEFT PROXIMAL POSTERIOR UPPER ARM
LOCATION DETAILED: LEFT PROXIMAL CALF
LOCATION DETAILED: INFERIOR THORACIC SPINE
LOCATION DETAILED: RIGHT VENTRAL DISTAL FOREARM
LOCATION DETAILED: RIGHT PROXIMAL PRETIBIAL REGION
LOCATION DETAILED: LEFT DISTAL POSTERIOR UPPER ARM
LOCATION DETAILED: RIGHT VENTRAL PROXIMAL FOREARM
LOCATION DETAILED: RIGHT LATERAL SUPERIOR CHEST
LOCATION DETAILED: LEFT POPLITEAL SKIN
LOCATION DETAILED: LEFT SUPERIOR MEDIAL BUCCAL CHEEK
LOCATION DETAILED: LEFT LATERAL ELBOW
LOCATION DETAILED: RIGHT POPLITEAL SKIN

## 2024-08-27 ASSESSMENT — LOCATION SIMPLE DESCRIPTION DERM
LOCATION SIMPLE: RIGHT UPPER BACK
LOCATION SIMPLE: RIGHT BREAST
LOCATION SIMPLE: RIGHT PRETIBIAL REGION
LOCATION SIMPLE: RIGHT POSTERIOR UPPER ARM
LOCATION SIMPLE: UPPER BACK
LOCATION SIMPLE: RIGHT THIGH
LOCATION SIMPLE: CHEST
LOCATION SIMPLE: LEFT POPLITEAL SKIN
LOCATION SIMPLE: RIGHT EYEBROW
LOCATION SIMPLE: LEFT CALF
LOCATION SIMPLE: RIGHT FOREARM
LOCATION SIMPLE: RIGHT POSTERIOR THIGH
LOCATION SIMPLE: LEFT FOREARM
LOCATION SIMPLE: LEFT ELBOW
LOCATION SIMPLE: RIGHT POPLITEAL SKIN
LOCATION SIMPLE: LEFT POSTERIOR UPPER ARM
LOCATION SIMPLE: ABDOMEN
LOCATION SIMPLE: RIGHT CHEEK
LOCATION SIMPLE: LEFT POSTERIOR THIGH
LOCATION SIMPLE: LEFT CHEEK
LOCATION SIMPLE: NOSE
LOCATION SIMPLE: LEFT PRETIBIAL REGION

## 2024-08-27 ASSESSMENT — LOCATION ZONE DERM
LOCATION ZONE: FACE
LOCATION ZONE: LEG
LOCATION ZONE: TRUNK
LOCATION ZONE: ARM
LOCATION ZONE: NOSE

## 2024-08-27 NOTE — PROCEDURE: LIQUID NITROGEN
Add 52 Modifier (Optional): no
Spray Paint Text: The liquid nitrogen was applied to the skin utilizing a spray paint frosting technique.
Consent: The patient's consent was obtained including but not limited to risks of crusting, scabbing, blistering, scarring, darker or lighter pigmentary change, recurrence, incomplete removal and infection.
Post-Care Instructions: I reviewed with the patient in detail post-care instructions. Patient is to wear sunprotection, and avoid picking at any of the treated lesions. Pt may apply Vaseline to crusted or scabbing areas.
Show Spray Paint Technique Variable?: Yes
Medical Necessity Clause: This procedure was medically necessary because the lesions that were treated were:  If lesion does not resolve, bx is needed.
Medical Necessity Information: It is in your best interest to select a reason for this procedure from the list below. All of these items fulfill various CMS LCD requirements except the new and changing color options.
Duration Of Freeze Thaw-Cycle (Seconds): 0
Detail Level: Detailed

## (undated) DEVICE — KIT ANESTHESIA W/CIRCUIT & 3/LT BAG W/FILTER (20EA/CA)

## (undated) DEVICE — SUCTION INSTRUMENT YANKAUER BULBOUS TIP W/O VENT (50EA/CA)

## (undated) DEVICE — SUTURE 4-0 VICRYL PLUS FS-2 - 27 INCH (36/BX)

## (undated) DEVICE — BANDAGE ELASTIC 4 HONEYCOMB - 4"X5YD LF (20/CA)"

## (undated) DEVICE — TRAY SKIN SCRUB PVP WET (20EA/CA) PART #DYND70356 DISCONTINUED

## (undated) DEVICE — BAG, SPONGE COUNT 50600

## (undated) DEVICE — DRAPE STRLE REG TOWEL 18X24 - (10/BX 4BX/CA)"

## (undated) DEVICE — LACTATED RINGERS INJ 1000 ML - (14EA/CA 60CA/PF)

## (undated) DEVICE — MASK ANESTHESIA ADULT  - (100/CA)

## (undated) DEVICE — ELECTRODE 850 FOAM ADHESIVE - HYDROGEL RADIOTRNSPRNT (50/PK)

## (undated) DEVICE — OLIVE WIRE

## (undated) DEVICE — SUTURE 2-0 SUPRAMID PL-20 (12PK/BX)

## (undated) DEVICE — BANDAGE ELASTIC 4 IN X 5 YDS - LATEX FREE(10/BX 5BX/CA)

## (undated) DEVICE — HEAD HOLDER JUNIOR/ADULT

## (undated) DEVICE — BLOCK

## (undated) DEVICE — NEEDLE NON SAFETY 27GA X 1-1/4 IN HYPO (100EA/BX)

## (undated) DEVICE — SODIUM CHL IRRIGATION 0.9% 1000ML (12EA/CA)

## (undated) DEVICE — WATER IRRIGATION STERILE 1000ML (12EA/CA)

## (undated) DEVICE — BOVIE NEEDLE TIP INSULATD NON-SAFETY 2CM (50/PK)

## (undated) DEVICE — Device

## (undated) DEVICE — GLOVE BIOGEL SZ 8 SURGICAL PF LTX - (50PR/BX 4BX/CA)

## (undated) DEVICE — TUBE CONNECTING SUCTION - CLEAR PLASTIC STERILE 72 IN (50EA/CA)

## (undated) DEVICE — SENSOR SPO2 NEO LNCS ADHESIVE (20/BX) SEE USER NOTES

## (undated) DEVICE — CHLORAPREP 26 ML APPLICATOR - ORANGE TINT(25/CA)

## (undated) DEVICE — KIT ROOM DECONTAMINATION

## (undated) DEVICE — GOWN WARMING STANDARD FLEX - (30/CA)

## (undated) DEVICE — BLADE OSCILLATING 9MMX24.6MMX0.4MM LIKE STRYKER 2296-3-111

## (undated) DEVICE — PACK LOWER EXTREMITY - (2/CA)

## (undated) DEVICE — SUTURE 5-0 VICRYL PLUS FS-2 27 (36PK/BX)"

## (undated) DEVICE — SUTURE GENERAL

## (undated) DEVICE — BANDAGE STER SOF-FORM 4X75IN - (12EA/BX 8BX/CA)

## (undated) DEVICE — PROTECTOR ULNA NERVE - (36PR/CA)

## (undated) DEVICE — GLOVE, LITE (PAIR)

## (undated) DEVICE — CANISTER SUCTION RIGID RED 1500CC (40EA/CA)

## (undated) DEVICE — SYRINGE 10 ML CONTROL LL (25EA/BX 4BX/CA)

## (undated) DEVICE — SUTURE 5-0 ETHILON FS-2 18 (12PK/BX)"

## (undated) DEVICE — SPONGE GAUZESTER 4 X 4 4PLY - (128PK/CA)

## (undated) DEVICE — SUTURE 3-0 VICRYL PLUSPS-2 - 18 INCH (36/BX)

## (undated) DEVICE — HUMID-VENT HEAT AND MOISTURE EXCHANGE- (50/BX)

## (undated) DEVICE — ELECTRODE DUAL RETURN W/ CORD - (50/PK)

## (undated) DEVICE — DRILL BIT 1.8MMX110MM GOLD (6TX2=12)

## (undated) DEVICE — MASK, LARYNGEAL AIRWAY #4

## (undated) DEVICE — BLADE SURGICAL #15 - (50/BX 3BX/CA)